# Patient Record
Sex: FEMALE | Race: WHITE
[De-identification: names, ages, dates, MRNs, and addresses within clinical notes are randomized per-mention and may not be internally consistent; named-entity substitution may affect disease eponyms.]

---

## 2018-09-23 ENCOUNTER — HOSPITAL ENCOUNTER (INPATIENT)
Dept: HOSPITAL 82 - ED | Age: 38
LOS: 4 days | Discharge: HOME | DRG: 563 | End: 2018-09-27
Attending: INTERNAL MEDICINE | Admitting: INTERNAL MEDICINE
Payer: COMMERCIAL

## 2018-09-23 VITALS — DIASTOLIC BLOOD PRESSURE: 73 MMHG | SYSTOLIC BLOOD PRESSURE: 134 MMHG

## 2018-09-23 VITALS — SYSTOLIC BLOOD PRESSURE: 159 MMHG | DIASTOLIC BLOOD PRESSURE: 91 MMHG

## 2018-09-23 VITALS — DIASTOLIC BLOOD PRESSURE: 94 MMHG | SYSTOLIC BLOOD PRESSURE: 138 MMHG

## 2018-09-23 VITALS — DIASTOLIC BLOOD PRESSURE: 90 MMHG | SYSTOLIC BLOOD PRESSURE: 117 MMHG

## 2018-09-23 VITALS — SYSTOLIC BLOOD PRESSURE: 150 MMHG | DIASTOLIC BLOOD PRESSURE: 103 MMHG

## 2018-09-23 VITALS — WEIGHT: 216.06 LBS | BODY MASS INDEX: 32 KG/M2 | HEIGHT: 69 IN

## 2018-09-23 VITALS — SYSTOLIC BLOOD PRESSURE: 128 MMHG | DIASTOLIC BLOOD PRESSURE: 64 MMHG

## 2018-09-23 VITALS — SYSTOLIC BLOOD PRESSURE: 134 MMHG | DIASTOLIC BLOOD PRESSURE: 73 MMHG

## 2018-09-23 VITALS — DIASTOLIC BLOOD PRESSURE: 89 MMHG | SYSTOLIC BLOOD PRESSURE: 173 MMHG

## 2018-09-23 VITALS — SYSTOLIC BLOOD PRESSURE: 149 MMHG | DIASTOLIC BLOOD PRESSURE: 80 MMHG
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
## 2018-09-23 DIAGNOSIS — M51.36: ICD-10-CM

## 2018-09-23 DIAGNOSIS — S93.401A: ICD-10-CM
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
## 2018-09-23 DIAGNOSIS — I10: ICD-10-CM

## 2018-09-23 DIAGNOSIS — G89.4: ICD-10-CM

## 2018-09-23 DIAGNOSIS — S82.831A: ICD-10-CM

## 2018-09-23 DIAGNOSIS — F10.129: ICD-10-CM

## 2018-09-23 DIAGNOSIS — F41.9: ICD-10-CM

## 2018-09-23 DIAGNOSIS — M48.061: ICD-10-CM

## 2018-09-23 DIAGNOSIS — H54.62: ICD-10-CM

## 2018-09-23 DIAGNOSIS — K59.00: ICD-10-CM

## 2018-09-23 DIAGNOSIS — S82.871A: Primary | ICD-10-CM

## 2018-09-23 DIAGNOSIS — E56.9: ICD-10-CM

## 2018-09-23 LAB
ALBUMIN SERPL-MCNC: 4.6 G/DL (ref 3.2–5)
ALP SERPL-CCNC: 91 U/L (ref 38–126)
ALT SERPL-CCNC: 56 U/L (ref 9–52)
ANION GAP SERPL CALCULATED.3IONS-SCNC: 25 MMOL/L
APTT PPP: 24 SECONDS (ref 20–32.5)
AST SERPL-CCNC: 59 U/L (ref 14–36)
BASOPHILS NFR BLD AUTO: 0 % (ref 0–3)
BILIRUB UR QL STRIP.AUTO: NEGATIVE
BUN SERPL-MCNC: 8 MG/DL (ref 7–17)
BUN/CREAT SERPL: 11
CHLORIDE SERPL-SCNC: 105 MMOL/L (ref 95–108)
CHOLEST SERPL-MCNC: 171 MG/DL (ref 0–199)
CHOLEST/HDLC SERPL: 4.9 {RATIO}
CLARITY UR: CLEAR
CO2 SERPL-SCNC: 17 MMOL/L (ref 22–30)
COLOR UR AUTO: YELLOW
CREAT SERPL-MCNC: 0.7 MG/DL (ref 0.5–1)
EOSINOPHIL NFR BLD AUTO: 2 % (ref 0–8)
ERYTHROCYTE [DISTWIDTH] IN BLOOD BY AUTOMATED COUNT: 13.4 % (ref 11.5–15.5)
ETHANOL SERPL-MCNC: 104 MG/DL (ref 0–30)
GLUCOSE UR STRIP.AUTO-MCNC: 100 MG/DL
HCT VFR BLD AUTO: 42.3 % (ref 37–47)
HDLC SERPL-MCNC: 35 MG/DL (ref 40–?)
HGB BLD-MCNC: 14 G/DL (ref 12–16)
HGB UR QL STRIP.AUTO: NEGATIVE
IMM GRANULOCYTES NFR BLD: 0.7 % (ref 0–5)
INR PPP: 0.9 RATIO (ref 0.7–1.3)
KETONES UR STRIP.AUTO-MCNC: NEGATIVE MG/DL
LDLC SERPL CALC-MCNC: 83 MG/DL
LEUKOCYTE ESTERASE UR QL STRIP.AUTO: NEGATIVE
LYMPHOCYTES NFR BLD: 39 % (ref 15–41)
MCH RBC QN AUTO: 28.8 PG  CALC (ref 26–32)
MCHC RBC AUTO-ENTMCNC: 33.1 G/L CALC (ref 32–36)
MCV RBC AUTO: 87 FL  CALC (ref 80–100)
MONOCYTES NFR BLD AUTO: 5 % (ref 2–13)
NEUTROPHILS # BLD AUTO: 6.77 THOU/UL (ref 2–7.15)
NEUTROPHILS NFR BLD AUTO: 54 % (ref 42–76)
NITRITE UR QL STRIP.AUTO: NEGATIVE
PH UR STRIP.AUTO: 5.5 [PH] (ref 4.5–8)
PLATELET # BLD AUTO: 251 THOU/UL (ref 130–400)
POTASSIUM SERPL-SCNC: 3.8 MMOL/L (ref 3.5–5.1)
PROT SERPL-MCNC: 8.1 G/DL (ref 6.3–8.2)
PROT UR QL STRIP.AUTO: (no result) MG/DL
PROTHROMBIN TIME: 10.5 SECONDS (ref 9–12.5)
RBC # BLD AUTO: 4.86 MILL/UL (ref 4.2–5.6)
SODIUM SERPL-SCNC: 143 MMOL/L (ref 137–146)
SP GR UR STRIP.AUTO: >=1.03
TRIGL SERPL-MCNC: 266 MG/DL (ref 30–149)
UROBILINOGEN UR QL STRIP.AUTO: 0.2 E.U./DL
VLDLC SERPL CALC-MCNC: 53 MG/DL

## 2018-09-23 PROCEDURE — 0QSGXZZ REPOSITION RIGHT TIBIA, EXTERNAL APPROACH: ICD-10-PCS

## 2018-09-23 PROCEDURE — 2W3LXYZ IMMOBILIZATION OF RIGHT LOWER EXTREMITY USING OTHER DEVICE: ICD-10-PCS

## 2018-09-23 NOTE — NUR
PT ARRIVED TO FLOOR VIA STRETCHER IN STABLE CONDITION ACCOMPANIED BY OR
STAFF AND ;PT ASSISTED TO HOSPITAL BED WITH 3 PERSON ASSIST;PT ORIENTED
TO ROOM AND CALL LIGHT SYSTEM AND VERBALIZES UNDERSTANDING;WT AND VS OBTAINED
BY FREEMAN WOLF;PT ALERT AND ORIENTED X3;PT REPORTS HAVING A "KID FREE NIGHT"
WITH HER  AND DRINKING TOO MUCH WHICH LED TO FALLING DOWN THE
STAIRS;PT REPORTS PAIN TO RIGHT ANKLE TO BE 10/10 ON THE PAIN SCALE,PT
TO BE MEDICATED WITH PRN PAIN MEDICATION PER MD ORDERS;ASSESSMENT
COMPLETED;RESPIRATIONS EVEN AND UNLABORED ON RA,CLEAR/DIMINISHED LUNG SOUNDS
NOTED;ABDOMEN DISTENDED/SOFT ON PALPATION AND ACTIVE IN ALL 4 QUADRANTS,LAST
BM 09/22;STRONG PEDAL PULSE TO LEFT FOOT,RIGHT UNABLE TO PALPATE DUE TO
XTERNAL FIXATOR;RIGHT ANKLE ELEVATED ON X2 PILLOWS PER ORDER;CAP REFILL LESS
THAN 3 SECONDS AND PT IS ABLE TO MOVE HER TOES;SCD TO LEFT FOOT IN PLACE;ALL
SAFETY PRECAUTIONS REINFORCED WITH BED IN THE LOWEST POSITION;ENCOURAGED PT TO
CALL FOR ASSISTANCE IF NEEDED;CALL LIGHT IN REACH;WILL CONTINUE TO MONITOR

## 2018-09-23 NOTE — NUR
PT COMPLAINS OF RIGHT ANKLE PAIN RATING 6/10 ON THE PAIN SCALE AND REQUESTS
PAIN MEDICATION;PT MEDICATED WITH PRN MORPHINE 2MG IVP,WILL CONTINUE TO
MONITOR

## 2018-09-23 NOTE — NUR
PT REPORTS RIGHT ANKLE PAIN RATING 9/10 ON THE PAIN SCALE AND REQUESTS PAIN
MEDICATION;PT MEDICATED WITH PRN MORPHINE 2MG IVP,WILL MONITOR FOR
EFFECTIVENESS

## 2018-09-23 NOTE — NUR
PT RESTING IN BED WITH SIGNIFICANT OTHER. PT IS ALERT AND ORIENTED X3. SHIFT
ASSESSMENT COMPLETED AT THIS TIME. IV PATENT X1. PLAN OF CARE REVIEWED WITH
PT. CALL LIGHT IN REACH. WILL CONTINUE TO MONITOR.

## 2018-09-23 NOTE — NUR
PT REPORTS THAT PERCOCET DID NOT RELIEVE PAIN AT ALL AND REQUESTS MORPHINE.
MEDICATED WITH MORPHINE PER MAR.

## 2018-09-23 NOTE — NUR
PT RESTING IN HIGH FOWLERS POSITION WITH SPOUSE IN BED;RESPIRATIONS REMAIN
EVEN AND UNLABORED ON RA;PT REPORTS RIGHT ANKLE PAIN RATING 8/10 ON THE PAIN
SCALE AND REQUESTS PAIN MEDICATION;PT MEDICATED WITH PRN PERCOCET 10/325MG 2
COMBO,WILL MONITOR FOR EFFECTIVENESS;EXTERNAL FIXATOR REMAINS IN PLACE TO
RIGHT ANKLE,DRESSING CDI;RIGHT ANKLE ELEVATED ON X2 PILLOWS;PT DENIES ANY
ADDITIONAL NEEDS;ASSESSMENT UNCHANGED;FALL PRECAUTIONS IN PLACE WITH CALL
LIGHT IN REACH;WILL CONTINUE TO MONITOR

## 2018-09-23 NOTE — NUR
PT MEDICATED WITH PRN DILAUDID 1MG IVP FOR RIGHT ANKLE PAIN RATING 10/10 ON
THE PAIN SCALE,WILL MONITOR FOR EFFECTIVENESS

## 2018-09-23 NOTE — NUR
PT RESTING IN BED WITH EYES CLOSED. SIGNIFICANT OTHER IN BED AS WELL. RESP ARE
EVEN AND UNLABORED. NO DISTRESS NOTED. CALL LIGHT IN REACH. WILL CONTINUE TO
MONITOR

## 2018-09-23 NOTE — NUR
ASSISTED FROM W/C TO STRETCHER. RETURNED FOOT TO NORMAL POSITION OF FUNCTION AN
SPLINTED WITH PILLOW. CSM CHECKS GOOD.

## 2018-09-23 NOTE — NUR
PT RESTING IN SUPINE POSITION WITH  IN BED;RESPIRATIONS EVEN AND
UNLABORED ON RA;IV SITE TO LEFT HAND INFUSING WITH EASE;PT DENIES ANY CURRENT
PAIN OR NEEDS;RIGHT ANKLE REMAINS ELEVATED ON X2 PILLOWS WITH EXTERNAL FIXATOR
IN PLACE;ENCOURAGED ICE PACKS 20MINS ON AND OFF,PT VERBALIZES
UNDERSTANDING;INSTRUCTED PT TO CALL FOR ASSISTANCE IF NEEDED;FALL PRECAUTIONS
IN PLACE WITH CALL LIGHT IN REACH;WILL CONTINUE TO MONITOR

## 2018-09-23 NOTE — NUR
BOLUS OF 250CC PER HOUR ADMINISTERED OVER 30 MINS PER SHE CAMARILLO R/T
TACHYCARDIA;WILL CONTINUE TO MONITOR

## 2018-09-24 VITALS — DIASTOLIC BLOOD PRESSURE: 89 MMHG | SYSTOLIC BLOOD PRESSURE: 140 MMHG

## 2018-09-24 VITALS — SYSTOLIC BLOOD PRESSURE: 161 MMHG | DIASTOLIC BLOOD PRESSURE: 92 MMHG

## 2018-09-24 VITALS — SYSTOLIC BLOOD PRESSURE: 151 MMHG | DIASTOLIC BLOOD PRESSURE: 88 MMHG

## 2018-09-24 VITALS — DIASTOLIC BLOOD PRESSURE: 86 MMHG | SYSTOLIC BLOOD PRESSURE: 142 MMHG

## 2018-09-24 VITALS — SYSTOLIC BLOOD PRESSURE: 156 MMHG | DIASTOLIC BLOOD PRESSURE: 91 MMHG

## 2018-09-24 LAB
ERYTHROCYTE [DISTWIDTH] IN BLOOD BY AUTOMATED COUNT: 13.5 % (ref 11.5–15.5)
HCT VFR BLD AUTO: 35.4 % (ref 37–47)
HGB BLD-MCNC: 11.6 G/DL (ref 12–16)
MCH RBC QN AUTO: 29.4 PG  CALC (ref 26–32)
MCHC RBC AUTO-ENTMCNC: 32.8 G/L CALC (ref 32–36)
MCV RBC AUTO: 89.6 FL  CALC (ref 80–100)
PLATELET # BLD AUTO: 179 THOU/UL (ref 130–400)
RBC # BLD AUTO: 3.95 MILL/UL (ref 4.2–5.6)

## 2018-09-24 NOTE — NUR
SPOKE WITH DR ZHENG IN REFERENCE TO PAIN. NEW ORDERS RECEIVED. REINFORCED
ACE WITH GAUZE AND KERLIX DUE TO BLEEDING. WILL CONTINUE TO MONITOR.

## 2018-09-24 NOTE — NUR
PATIENT AWAKE WITH  AT BEDSIDE. PATIENT HAS C/O OF RIGHT ANKLE PAIN
WILL MEDICATE PER MD ORDERS. RESP EVEN AND UNLABORED.

## 2018-09-24 NOTE — NUR
ASSESSMENT IS COMPLETED: PT IS ALERT, NO DISTRESS NOTED. IV SITE IS FREE FROM
REDNESS OR EDEMA. HR IS REG, PULSES ARE STRONG X4, ABD IS SOFT WITH ACTIVE BS.
DRESSING ON R ANKLE IS CDI. CONTINUE TO OSBERVE AND MONITOR

## 2018-09-24 NOTE — NUR
PT RESTING IN BED WITH SIGNIFICANT OTHER. RESP ARE EVEN AND UNLABORED. NO
DISTRESS NOTED. CALL LIGHT IN REACH. WILL CONTINUE TO MONITR.

## 2018-09-24 NOTE — NUR
REPORT GIVEN BY COLTEN WEST. PATIENT RESTING IN BED WITH FAMILY AT THE BEDSIDE.
RESP EVEN AND UNLABORED. NO S/S OF DISTRESS NOTED. PLAN OF CARE DISCUSSED,
FALL PRECAUTIONS IN PLACE, AND PATIENT INFORMED TO CALL WITH ANY QUESTIONS OR
CONCERNS.

## 2018-09-24 NOTE — NUR
CALLED OR TO GET AN UPDATE ON POSSIBLE OR TOMORROW. , NOTHING IS ON THE BOOKS
FOR TOMORROW. WAITING FOR THE DR'S NOTE. CONTINUE TO OSBERVE AND MONITOR

## 2018-09-24 NOTE — NUR
ATTEMPTED EVAL THIS AM. HOWEVER, PT REFUSED DUE TO EXTREME PAIN ON R ANKLE
WHEN SHE TRIES TO MOVE. REPORTS THAT SHE WILL UNDERGO SURGERY FOR R ANKLE LUIS ARMANDO
AND PREFERS TO PARTICIPATE WITH P.KAI POWELL AFTER THE SURGERY.

## 2018-09-24 NOTE — NUR
PT WITH COMPLAINTS OF EXCRUTIATING PAIN 10/10. PATIENT IN TEARS IN ROOM.
MORPHINE IS NOT DUE AT THIS TIME. MEDICATED WITH 2 PERCOCET AND 10MG
CYCLOBENZAPRINE PER MAR

## 2018-09-25 VITALS — SYSTOLIC BLOOD PRESSURE: 135 MMHG | DIASTOLIC BLOOD PRESSURE: 69 MMHG

## 2018-09-25 VITALS — SYSTOLIC BLOOD PRESSURE: 144 MMHG | DIASTOLIC BLOOD PRESSURE: 81 MMHG

## 2018-09-25 VITALS — DIASTOLIC BLOOD PRESSURE: 92 MMHG | SYSTOLIC BLOOD PRESSURE: 146 MMHG

## 2018-09-25 VITALS — SYSTOLIC BLOOD PRESSURE: 128 MMHG | DIASTOLIC BLOOD PRESSURE: 84 MMHG

## 2018-09-25 VITALS — SYSTOLIC BLOOD PRESSURE: 143 MMHG | DIASTOLIC BLOOD PRESSURE: 85 MMHG

## 2018-09-25 NOTE — NUR
ASSESSMENT IS COMPLETED: IV SITE IS FREE FROM REDNESS OR EDEMA. HR IS REG,
PULSES ARE STRONG X4, ABD IS SOFT WITH ACTIVE BS. BREATH SOUNDS ARE CLEAR,
BILATERALLY. CONTINUE TO OBSERVE AND MONITOR.

## 2018-09-25 NOTE — NUR
AM:  11:20:  PCHARITY POWELL. DONE AND TRANSFER TRAINING MAINTAINING NWB RIGHT DONE.
PATIENT LEFT COMFORTABLE IN THE CHAIR.

## 2018-09-25 NOTE — NUR
The patient is seen in PM. She has just gotten BTB NWB using the walker with
assistance of family. She is instructed in and demonstrated HEP of gluteal and
quad sets and advised to stand 6 x daily when DC home. She continues to have
some luciana blood at the bottom of her dressing but has no pain. She is
independent with bed mobility and with her HEP

## 2018-09-25 NOTE — NUR
RECEIVED CHANGE OF SHIFT REPORT FROM BRETT ABEL. PT ALERT AND ORIENTED X 3 AND
LYING IN BED. STATES PAIN LEVEL OF 4 ON 0-10 PAIN SCALE, WHICH SHE CAN
TOLERATE. RLE WITH EXTERNAL FIXATOR ELEVATED ON PILLOW. FOOT COOL TO TOUCH. PT
ABLE TO WIGGLE TOES. NO APPARENT ACUTE DISTRESS NOTED. WILL CONTINUE TO
MONITOR.

## 2018-09-25 NOTE — NUR
SPOKE WITH  RE: SURGERY (YES OR NO). PLANS ON DOING SURGERY NEXT
TUESDAY. WILL COME AND SEE THE PT TODAY. WANTING TO SEND HER HOME. PT IS NOT
WANTING TO GO HOME RIGHT NOW DUE TO (MOVE TO A NEW PLACE, WILL BE ALONE,
 WILL BE OFF ON THE WEEKEND). CONTINUE TO OBSERVE AND MONITOR.

## 2018-09-25 NOTE — NUR
PT ARRIVED TO THE FLOOR IN STABLE CONDITION VIA STRETCHER AND ACCOMPANIED BY
ED NURSE. PT WEIGHED AND SETTLED TO BED. DENIES CHEST PAIN OR DISCOMFORT AT
THIS TIME. ORIENT PT TO ROOM CALL SYSTEM. BED IN LOW POSITION AND CALL LIGHT
IN REACH.

## 2018-09-25 NOTE — NUR
PATIENT AWAKE WITH C/O PAIN WILL MEDICATE PER MD ORDERS. RESP EVEN AND
UNLABORED.  PRESENT AT THE BEDSIDE.

## 2018-09-26 VITALS — SYSTOLIC BLOOD PRESSURE: 128 MMHG | DIASTOLIC BLOOD PRESSURE: 85 MMHG

## 2018-09-26 VITALS — DIASTOLIC BLOOD PRESSURE: 85 MMHG | SYSTOLIC BLOOD PRESSURE: 132 MMHG

## 2018-09-26 VITALS — SYSTOLIC BLOOD PRESSURE: 123 MMHG | DIASTOLIC BLOOD PRESSURE: 67 MMHG

## 2018-09-26 VITALS — DIASTOLIC BLOOD PRESSURE: 86 MMHG | SYSTOLIC BLOOD PRESSURE: 136 MMHG

## 2018-09-26 VITALS — DIASTOLIC BLOOD PRESSURE: 87 MMHG | SYSTOLIC BLOOD PRESSURE: 152 MMHG

## 2018-09-26 VITALS — DIASTOLIC BLOOD PRESSURE: 80 MMHG | SYSTOLIC BLOOD PRESSURE: 125 MMHG

## 2018-09-26 LAB
ANION GAP SERPL CALCULATED.3IONS-SCNC: 19 MMOL/L
BUN SERPL-MCNC: 6 MG/DL (ref 7–17)
BUN/CREAT SERPL: 11
CHLORIDE SERPL-SCNC: 103 MMOL/L (ref 95–108)
CO2 SERPL-SCNC: 25 MMOL/L (ref 22–30)
CREAT SERPL-MCNC: 0.6 MG/DL (ref 0.5–1)
ERYTHROCYTE [DISTWIDTH] IN BLOOD BY AUTOMATED COUNT: 13.4 % (ref 11.5–15.5)
HCT VFR BLD AUTO: 38.9 % (ref 37–47)
HGB BLD-MCNC: 12.7 G/DL (ref 12–16)
MCH RBC QN AUTO: 29.2 PG  CALC (ref 26–32)
MCHC RBC AUTO-ENTMCNC: 32.6 G/L CALC (ref 32–36)
MCV RBC AUTO: 89.4 FL  CALC (ref 80–100)
PLATELET # BLD AUTO: 230 THOU/UL (ref 130–400)
POTASSIUM SERPL-SCNC: 3.8 MMOL/L (ref 3.5–5.1)
RBC # BLD AUTO: 4.35 MILL/UL (ref 4.2–5.6)
SODIUM SERPL-SCNC: 143 MMOL/L (ref 137–146)

## 2018-09-26 NOTE — NUR
PM. PT WAS SEEN FOR GT. SHE WAS ABLE TO GET OOB INDEPENDENTLY. COMPLAINING OF
R FOOT THROBBING PAIN PS 7/10. PT ASSUMED STANDING FROM SITTING PUSHING SELF
UP ON BED WITH SBA TO ENSURE SAFETY. PT THEN AMBULATED BEDSIDE ~10 FT. X 2
WITH A STANDARD WALKER AND CGA. PT APPEARED MORE STABLE WITH A STANDARD WALKER
AS SHE HOPPED ON L LE. PT REPOSITIONED HERSELF ON BED INDEPENDENTLY. CALL BELL
WITHIN REACH. NO ADVERSE RXNS NOTED OR REPORTED AT THE END OF TX.

## 2018-09-26 NOTE — NUR
PT RESTING QUIETLY IN BED AND APPEARS TO BE ASLEEP. WAKES UP SPONTANEOUSLY. NO
APPARENT ACUTE DISTRESS NOTED.

## 2018-09-26 NOTE — NUR
PT IS RELAXING, IN BED VISITING WITH FAMILY. NO DISTRESS NOTED. R LEG IS WARM
TO THE TOUCH.,HAS A SPOT FROM SCRAPING THE LEG ON A DOG CRATE AT HOME.
CONTINUE TO OSBERVE AND MONITOR.

## 2018-09-26 NOTE — NUR
PATIENT SEEN FOR GT WITH CRUTCHES DUE TO CONCERNS FOR D/C HOME. PATIENT DOES
NOT WISH TO GO TO INPATIENT REHAB, BUT WANTS TO BE HOME WITH HER FAMILY UNTIL
SHE HAS SECONDARY SURGERY NEXT TUESDAY.  SHE STATES THAT A WALKER WITH A SEAT
WAS PURCHASED FOR USE AT HOME.  WE ATTEMPTED CRUTCH TRAINING.  THIS IS BETTER
FOR HER ONLY DUE TO NOTHING SURROUNDEING HER EXT FIXATOR ON R FOOT. SHE IS
ABLE TO MAINTAIN NWB R LE, BUT HAS LESS STABILITY WITH CRUTCHES, TENDS TO HOLD
FOOT BEHIND HER, WHICH PUTS HER MORE AT RISK FOR A FALL FORWARD.  WITH THE
WALKER, SHE HAS MORE STABILITY, ESPECIALLY WITH TRANSFERS.  WE BROUGHT A
ROLLATOR FOR HER TO SEE AND IMMEDIATELY SHE STATED THAT IT WAS NOT EVEN STABLE
ENOUGH TO STAND WITH.  PATIENT REQUIRED CGA FOR CRUTCH WALKING NWB R X 25 FEET
TO BR FOR TOILETING.  SHE WAS ABLE TO AMB 50 FEET WITH CLOSE SBA WITH WALKER.
SHE HAS BEEN USING THE WALKER FOR OOB TO BR DURING THE DAY, WITHOUT
DIFFICULTY.  PATIENT DOES NOT HAVE STAIRS AT HOME, THERFORE, CRUTCHES ARE NOT
A NECESSITY.  SHE IS NOT SAFE FOR D/C HOME WITHOUT A STABLE ASSISTVE DEVICE.
WE WILL RECOMMEND HOLDING D/C FOR MORE GT WITH CRUTCHES AND/OR AN APPROPRIATE
STANDARD WALKER TO ENSURE SAFETY WITH AMB AND TRANSFERS.  DISCUSSED FINDINGS
WITH [employer] AND WE ARE IN AGREEMENT TO RESUME ASSESSMENT OF SAFETY
TOMORROW. WILL TRY CRUTCHES WITH HAND  ADJUSTED FOR IMPROVED STABILITY.

## 2018-09-26 NOTE — NUR
PT MEDICATED AS ORDERS PROVIDE AND MEDICATED FOR PAIN 7/10 REPORTED ON PAIN
SCALE. PT ASSESSED, LUNG SOUNDS ARE CLEAR, ABD ACTIVE BOWEL SOUNDS NON-TENDER,
DRESSING TO RIGHT ANKLE IS CDI, TOES ARE WARM TO TOUCH AND PT IS ABLE TO
MOVE TOES. PT  REPORTS STAYING THE NIGHT WITH HER. PT DENIES ANY OTHER
NEEDS AT THIS TIME. CALL LIGHT AT BEDSIDE.

## 2018-09-26 NOTE — NUR
PT RESTED WELL DURING THE NIGHT. PAIN CONTROLLED WITH ALTERNATING MEDICATIONS.
NO APPARENT ACUTE CHANGES NOTED IN PT'S CONDITION.

## 2018-09-26 NOTE — NUR
The patient was seen this AM for review of her HEP and for ambulation. She was
able to transfer OOB to stand independently and had no difficulty walking on a
lvel surface for about 50 feet NWB. She was using a FWW and I do recommend
this for home as she has no stairs but is in the process of bulding a house.
She lives in an apartment now and has moving boxes and clutter she must
navigate. SHe is indpendent with her gluteal/ quad sets and her toe wiggles
and understands to perform repeatedly during the day

## 2018-09-26 NOTE — NUR
PT IS IN BED W/RIGHT FOOT ELEVATED. DRESSING IS CDI,  IS AT BEDSIDE. PT
DENIES ANY NEEDS AT THIS TIME. CALL LIGHT AT SIDE AND PT ENCOURAGED TO CALL IF
ANY NEEDS ARISE.

## 2018-09-26 NOTE — NUR
ASSESSMENT IS COMPLETD: IV SITE IS FREE FROM REDNESS OR EDEMA. HR IS
REG,PULSES ARE STRONG X4, ABD IS SOFT WITH ACTIVE BS. DRESSING ON R ANKLE IS
CDI. BREATH SOUNDS ARE CLEAR, BILATERALLY. CONTINUE TO OSEBRVE AND MONITOR

## 2018-09-27 VITALS — SYSTOLIC BLOOD PRESSURE: 142 MMHG | DIASTOLIC BLOOD PRESSURE: 87 MMHG

## 2018-09-27 VITALS — SYSTOLIC BLOOD PRESSURE: 151 MMHG | DIASTOLIC BLOOD PRESSURE: 94 MMHG

## 2018-09-27 VITALS — DIASTOLIC BLOOD PRESSURE: 80 MMHG | SYSTOLIC BLOOD PRESSURE: 138 MMHG

## 2018-09-27 LAB
ANION GAP SERPL CALCULATED.3IONS-SCNC: 15 MMOL/L
BUN SERPL-MCNC: 8 MG/DL (ref 7–17)
BUN/CREAT SERPL: 13
CHLORIDE SERPL-SCNC: 103 MMOL/L (ref 95–108)
CO2 SERPL-SCNC: 29 MMOL/L (ref 22–30)
CREAT SERPL-MCNC: 0.6 MG/DL (ref 0.5–1)
POTASSIUM SERPL-SCNC: 4.3 MMOL/L (ref 3.5–5.1)
SODIUM SERPL-SCNC: 142 MMOL/L (ref 137–146)

## 2018-09-27 NOTE — NUR
SHIFT CHANGE REPORT FROM MITZY PEPE AWAKE AND ALERT RESTING IN BED, EXTERNAL
FIXATOR IN PLACE TO RIGHT FOOT, DRESSING IN PLACE WITH NO SIGN DRAINAGE, PAIN
CONTROLLED WITH ANALGESICS, CALL BELL IN REACH.

## 2018-09-27 NOTE — NUR
PATIENT REPORTS SHE WENT TO GET UP AND GO TO THE BATHROOM WITH WALKER AND SHE
ACCIDENTLALLY PUT WEIGHT ON THE BALL OF HER RIGHT FOOT FOR A SECOND AND HAD
SIGNIFICANT PAIN IN HER ANKLE, TO BRING HER TO TEARS. SHE DOES NOT FEEL
COMFORTABLE TRYING CRUTCHES AGAIN TODAY AS SHE DOES NOT FEEL SFE TRANSFERRING
WITHOUT THE WALKER.  SUPINT TO SIT INDEP AND SIT TO STAND TO STANDARD WALKER
WITH CGA ANC V.C.'S FOR HAND PLACEMENT.  PATIENT AMB 60 FT TO END OF RAMIREZ AND
SAT IN TO REST.  SHE WAS ABLE TO MAINTAIN NWB R LE AND REQUIRED INTERMITTENT
V.C.'S TO DECREASE STEP LENGTH AND MAINTAIN MANOJ WITHIN WALKER.  PATIENT ABLE
TO STAND FROM CHAIR SAFELY AND AMB BACK TO ROOM 60 FT.  STAND TO SIT SAFELY
WITH SBA. LEGS ELEVATED, ABLE TO WIGGLE TOES.  FAMILY WISHES TO RENT A KNEE
SCOOTER.  THIS MAY BE BETTER SERVED ONCE SHE HAS EXT FIXATOR REMOVED WITH
SECONDARY SURGERY.  INFORMED ARNP AND CASE MANAGEMENT THAT PATIENT IS SAFE
WTI[facility] STANDARD WALKER ONLY. NO NEED FOR CRUTCHES AND UNDER NO CIRCUMSTANCES TO
USE ROLLATOR.

## 2018-09-27 NOTE — NUR
ATE MEAL AND RESTING IN BED, ALL NEEDS ADDRESSED, WILL CONTINUE TO MONITOR.
NORMA HODGSON JUST BROUGHT IN WALKER FOR PT TO TAKE HOME AND TOOK "[employer]'S WALKER
FROM ROOM TO RETURN TO STORAGE.

## 2018-09-27 NOTE — NUR
PT WAS MEDICATED FOR PAIN REPORTED 9/10 ON PAIN SCALE. PT APPEARS IN MILD
DISTRESS DUE TO PAIN. PT SPOUSE IS AT BEDSIDE PREPARING ICE DRINKS FOR PT.
 MENTIONED DRAINAGE ON PILLOW CASE FROM LOWER PART OF FIXATOR TO ANKLE.
I OBSERVED SMALL AMOUNT OF DRAINAGE FROM LOWER PART OF DRESSING TO RIGHT FOOT.
PILLOW CASE CHANGED AND PAD PLACED UNDER RIGHT FOOT FOR DRAINAGE OBSERVATION.
ICE-PACKS PLACED ON EITHER SIDE OF RIGHT ANKLE AND PT.ENCOURAGED TO CALL FOR
ANY OTHER NEEDS.

## 2018-12-26 ENCOUNTER — HOSPITAL ENCOUNTER (INPATIENT)
Dept: HOSPITAL 82 - ED | Age: 38
LOS: 1 days | Discharge: HOME | DRG: 494 | End: 2018-12-27
Attending: INTERNAL MEDICINE | Admitting: INTERNAL MEDICINE
Payer: COMMERCIAL

## 2018-12-26 VITALS — WEIGHT: 230.37 LBS | HEIGHT: 69 IN | BODY MASS INDEX: 34.12 KG/M2

## 2018-12-26 DIAGNOSIS — F32.9: ICD-10-CM

## 2018-12-26 DIAGNOSIS — S82.871D: ICD-10-CM

## 2018-12-26 DIAGNOSIS — I10: ICD-10-CM
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
## 2018-12-26 DIAGNOSIS — S82.251A: Primary | ICD-10-CM

## 2018-12-26 DIAGNOSIS — E78.5: ICD-10-CM

## 2018-12-26 DIAGNOSIS — F41.1: ICD-10-CM
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
## 2018-12-26 LAB
ALBUMIN SERPL-MCNC: 4.2 G/DL (ref 3.2–5)
ALP SERPL-CCNC: 124 U/L (ref 38–126)
ANION GAP SERPL CALCULATED.3IONS-SCNC: 16 MMOL/L
AST SERPL-CCNC: 39 U/L (ref 14–36)
BASOPHILS NFR BLD AUTO: 0 % (ref 0–3)
BUN SERPL-MCNC: 13 MG/DL (ref 7–17)
BUN/CREAT SERPL: 21
CHLORIDE SERPL-SCNC: 108 MMOL/L (ref 95–108)
CO2 SERPL-SCNC: 21 MMOL/L (ref 22–30)
CREAT SERPL-MCNC: 0.6 MG/DL (ref 0.5–1)
EOSINOPHIL NFR BLD AUTO: 1 % (ref 0–8)
ERYTHROCYTE [DISTWIDTH] IN BLOOD BY AUTOMATED COUNT: 14.3 % (ref 11.5–15.5)
HCT VFR BLD AUTO: 40.9 % (ref 37–47)
HGB BLD-MCNC: 13.4 G/DL (ref 12–16)
IMM GRANULOCYTES NFR BLD: 0.6 % (ref 0–5)
LYMPHOCYTES NFR BLD: 32 % (ref 15–41)
MCH RBC QN AUTO: 27.5 PG  CALC (ref 26–32)
MCHC RBC AUTO-ENTMCNC: 32.8 G/L CALC (ref 32–36)
MCV RBC AUTO: 84 FL  CALC (ref 80–100)
MONOCYTES NFR BLD AUTO: 6 % (ref 2–13)
NEUTROPHILS # BLD AUTO: 6.89 THOU/UL (ref 2–7.15)
NEUTROPHILS NFR BLD AUTO: 60 % (ref 42–76)
PLATELET # BLD AUTO: 235 THOU/UL (ref 130–400)
POTASSIUM SERPL-SCNC: 3.8 MMOL/L (ref 3.5–5.1)
PROT SERPL-MCNC: 7.8 G/DL (ref 6.3–8.2)
RBC # BLD AUTO: 4.87 MILL/UL (ref 4.2–5.6)
SODIUM SERPL-SCNC: 141 MMOL/L (ref 137–146)

## 2018-12-26 PROCEDURE — 0QSG3BZ REPOSITION RIGHT TIBIA WITH MONOPLANAR EXTERNAL FIXATION DEVICE, PERCUTANEOUS APPROACH: ICD-10-PCS

## 2018-12-26 NOTE — NUR
PATIENT TO ROOM 6. ASSISTED ONTO STRETCHER. BOOT REMOVED FROM RLE, ACE WRAP
REMOVED. TRIAGE COMPLETED AT BEDSIDE.

## 2018-12-27 VITALS — SYSTOLIC BLOOD PRESSURE: 127 MMHG | DIASTOLIC BLOOD PRESSURE: 71 MMHG

## 2018-12-27 VITALS — DIASTOLIC BLOOD PRESSURE: 81 MMHG | SYSTOLIC BLOOD PRESSURE: 173 MMHG

## 2018-12-27 VITALS — DIASTOLIC BLOOD PRESSURE: 62 MMHG | SYSTOLIC BLOOD PRESSURE: 142 MMHG

## 2018-12-27 VITALS — DIASTOLIC BLOOD PRESSURE: 68 MMHG | SYSTOLIC BLOOD PRESSURE: 143 MMHG

## 2018-12-27 VITALS — SYSTOLIC BLOOD PRESSURE: 160 MMHG | DIASTOLIC BLOOD PRESSURE: 94 MMHG

## 2018-12-27 VITALS — SYSTOLIC BLOOD PRESSURE: 142 MMHG | DIASTOLIC BLOOD PRESSURE: 81 MMHG

## 2018-12-27 VITALS — SYSTOLIC BLOOD PRESSURE: 183 MMHG | DIASTOLIC BLOOD PRESSURE: 99 MMHG

## 2018-12-27 VITALS — SYSTOLIC BLOOD PRESSURE: 131 MMHG | DIASTOLIC BLOOD PRESSURE: 82 MMHG

## 2018-12-27 VITALS — SYSTOLIC BLOOD PRESSURE: 162 MMHG | DIASTOLIC BLOOD PRESSURE: 86 MMHG

## 2018-12-27 NOTE — NUR
PT SEEN AWAKE, ALERT, ORIENTED X 3.  PT IS NEGATIVE ASSESSMENT WITH EXCEPTION
OF RIGHT LEG, WHICH HAS EXTERNAL FIXATOR IN PLACE.  PT ANTICIPATES DISCHARGE
TO HOME TODAY.  PAIN CONTROLLED BY MORPHINE AND PERCOCET.

## 2018-12-27 NOTE — NUR
PT ARRIVED TO UNIT WITH OR STAFF IN STABLE CONDITION; ALERT AND ORIENTED. C/O
MINIMAL PAIN TO RLE. EXTERNAL FIXATOR IN PLACE AND LEG ELEVATED ON PILLOW. SCD
TO LLE.  AT BEDSIDE. LR INFUSING UPON ARRIVAL; IV SITE APPEARS HEALTHY.
RESPIRATIONS EVEN AND UNLABORED ON ROOM AIR. ORIENTED TO ROOM AND CALL LIGHT
SYSTEM. PLAN OF CARE DISCUSSED. PT ENCOURAGED TO VERBALIZE CONCERNS. STATES
UNDERSTANDING. SAFETY MEASURES IN PLACE. CALL LIGHT WITHIN REACH.

## 2018-12-27 NOTE — NUR
PT ASLEEP AT THIS TIME WITH NO SIGNS OF DISTRESS. RESPIRATIONS EVEN AND
UNLABORED ON ROOM AIR. MORPHINE AND PERCOCET HAVE BEEN EFFECTIVE FOR PAIN.
SCD TO LLE. EDUCATED ON IS AND ENCOURAGED USE. SAFETY MEASURES IN PLACE. CALL
LIGHT WITHIN REACH.

## 2018-12-27 NOTE — NUR
MORPHINE GIVEN FOR MODERATE PAIN TO RLE; CSM GOOD TO RIGHT FOOT. PHARMACY
CALLED TO CLARIFY ZOFRAN WITH HOME MED TRAZODONE; PT REPORTS THAT SHE HAS NOT
TAKEN TRAZODONE IN 2 WEEKS. ZOFRAN PROFILED; WILL CLARIFY WITH MD IN THE
MORNING. NORMAL SALINE NOW INFUSING.  LAYING IN BED WITH PT. NO OTHER
REQUESTS OR CONCERNS AT THIS TIME.

## 2019-07-25 ENCOUNTER — NEW PATIENT COMPREHENSIVE (OUTPATIENT)
Dept: URBAN - METROPOLITAN AREA CLINIC 47 | Facility: CLINIC | Age: 39
End: 2019-07-25

## 2019-07-25 DIAGNOSIS — H53.8: ICD-10-CM

## 2019-07-25 PROCEDURE — 92004 COMPRE OPH EXAM NEW PT 1/>: CPT

## 2019-07-25 PROCEDURE — 92015 DETERMINE REFRACTIVE STATE: CPT

## 2019-07-25 ASSESSMENT — VISUAL ACUITY
OD_CC: 20/30+2
OS_CC: >J12
OS_CC: 20/400
OD_CC: J1

## 2019-07-25 ASSESSMENT — TONOMETRY
OS_IOP_MMHG: 15
OD_IOP_MMHG: 17

## 2020-01-10 ENCOUNTER — HOSPITAL ENCOUNTER (EMERGENCY)
Age: 40
LOS: 1 days | Discharge: HOME | DRG: 603 | End: 2020-01-11
Payer: COMMERCIAL

## 2020-01-10 DIAGNOSIS — L03.115: Primary | ICD-10-CM

## 2020-01-10 DIAGNOSIS — Z79.84: ICD-10-CM

## 2020-01-10 DIAGNOSIS — E11.65: ICD-10-CM

## 2020-01-10 DIAGNOSIS — I10: ICD-10-CM

## 2020-01-10 LAB
ALBUMIN SERPL-MCNC: 4.1 G/DL (ref 3.2–5)
ALP SERPL-CCNC: 113 U/L (ref 38–126)
ANION GAP SERPL CALCULATED.3IONS-SCNC: 16 MMOL/L
AST SERPL-CCNC: 43 U/L (ref 14–36)
BASOPHILS NFR BLD AUTO: 0 % (ref 0–3)
BUN SERPL-MCNC: 11 MG/DL (ref 7–17)
BUN/CREAT SERPL: 20
CHLORIDE SERPL-SCNC: 104 MMOL/L (ref 95–108)
CO2 SERPL-SCNC: 21 MMOL/L (ref 22–30)
CREAT SERPL-MCNC: 0.5 MG/DL (ref 0.5–1)
EOSINOPHIL NFR BLD AUTO: 2 % (ref 0–8)
ERYTHROCYTE [DISTWIDTH] IN BLOOD BY AUTOMATED COUNT: 14.3 % (ref 11.5–15.5)
HCT VFR BLD AUTO: 39.6 % (ref 37–47)
HGB BLD-MCNC: 13 G/DL (ref 12–16)
IMM GRANULOCYTES NFR BLD: 0.5 % (ref 0–5)
LYMPHOCYTES NFR BLD: 30 % (ref 15–41)
MCH RBC QN AUTO: 27.7 PG  CALC (ref 26–32)
MCHC RBC AUTO-ENTMCNC: 32.8 G/L CALC (ref 32–36)
MCV RBC AUTO: 84.4 FL  CALC (ref 80–100)
MONOCYTES NFR BLD AUTO: 5 % (ref 2–13)
NEUTROPHILS # BLD AUTO: 6.1 THOU/UL (ref 2–7.15)
NEUTROPHILS NFR BLD AUTO: 62 % (ref 42–76)
PLATELET # BLD AUTO: 211 THOU/UL (ref 130–400)
POTASSIUM SERPL-SCNC: 3.7 MMOL/L (ref 3.5–5.1)
PROT SERPL-MCNC: 7.8 G/DL (ref 6.3–8.2)
RBC # BLD AUTO: 4.69 MILL/UL (ref 4.2–5.6)
SODIUM SERPL-SCNC: 137 MMOL/L (ref 137–146)

## 2022-07-30 ENCOUNTER — TELEPHONE ENCOUNTER (OUTPATIENT)
Age: 42
End: 2022-07-30

## 2022-07-31 ENCOUNTER — TELEPHONE ENCOUNTER (OUTPATIENT)
Age: 42
End: 2022-07-31